# Patient Record
Sex: MALE | Race: WHITE | URBAN - METROPOLITAN AREA
[De-identification: names, ages, dates, MRNs, and addresses within clinical notes are randomized per-mention and may not be internally consistent; named-entity substitution may affect disease eponyms.]

---

## 2019-01-25 ENCOUNTER — APPOINTMENT (OUTPATIENT)
Age: 29
Setting detail: DERMATOLOGY
End: 2019-01-29

## 2019-01-25 DIAGNOSIS — L40.0 PSORIASIS VULGARIS: ICD-10-CM

## 2019-01-25 PROCEDURE — OTHER COUNSELING: OTHER

## 2019-01-25 PROCEDURE — OTHER TREATMENT REGIMEN: OTHER

## 2019-01-25 PROCEDURE — OTHER PRESCRIPTION: OTHER

## 2019-01-25 PROCEDURE — 99202 OFFICE O/P NEW SF 15 MIN: CPT

## 2019-01-25 PROCEDURE — OTHER MIPS QUALITY: OTHER

## 2019-01-25 RX ORDER — HALOBETASOL PROPIONATE 0.1 MG/G
LOTION TOPICAL
Qty: 1 | Refills: 3 | Status: ERX | COMMUNITY
Start: 2019-01-25

## 2019-01-25 RX ORDER — EMOLLIENT COMBINATION NO.32
EMULSION, EXTENDED RELEASE TOPICAL
Qty: 1 | Refills: 3 | Status: ERX | COMMUNITY
Start: 2019-01-25

## 2019-01-25 ASSESSMENT — LOCATION ZONE DERM: LOCATION ZONE: LEG

## 2019-01-25 ASSESSMENT — LOCATION DETAILED DESCRIPTION DERM: LOCATION DETAILED: RIGHT ANTERIOR PROXIMAL THIGH

## 2019-01-25 ASSESSMENT — LOCATION SIMPLE DESCRIPTION DERM: LOCATION SIMPLE: RIGHT THIGH

## 2019-01-25 NOTE — HPI: RASH
What Type Of Note Output Would You Prefer (Optional)?: Standard Output
How Severe Is Your Rash?: moderate
Is This A New Presentation, Or A Follow-Up?: Rash
Additional History: Patient went into urgent care and was prescribed Ketoconazole cream for a few weeks. Patient laid in bed and noticed the next day it flared up more; same spot, then went to PCP and was prescribed ultra lotrimen for tinea versicolor. Patient reports no other areas are affected and seems to be in one spot only; not spreading.

## 2019-01-25 NOTE — PROCEDURE: TREATMENT REGIMEN
Action 1: Continue
Detail Level: Zone
Discontinue Regimen: Ketoconazole cream \\nLotrimen Ultra
Samples Given: Bryhali lotion x 6\\nEpiCeram lotion x 2
Start Regimen: Bryhali 0.01 % lotion: Apply to affected areas QD for 2 weeks on leg *[facility]*\\nEpiCeram topical emulsion, extended release: Apply to affected areas QD/PRN *[facility]*

## 2019-03-08 ENCOUNTER — APPOINTMENT (OUTPATIENT)
Age: 29
Setting detail: DERMATOLOGY
End: 2019-03-09

## 2019-03-08 DIAGNOSIS — L40.0 PSORIASIS VULGARIS: ICD-10-CM

## 2019-03-08 PROCEDURE — 99213 OFFICE O/P EST LOW 20 MIN: CPT

## 2019-03-08 PROCEDURE — OTHER TREATMENT REGIMEN: OTHER

## 2019-03-08 PROCEDURE — OTHER COUNSELING: OTHER

## 2019-03-08 PROCEDURE — OTHER PRESCRIPTION: OTHER

## 2019-03-08 RX ORDER — CALCIPOTRIENE 50 UG/G
AEROSOL, FOAM TOPICAL
Qty: 1 | Refills: 2 | Status: ERX | COMMUNITY
Start: 2019-03-08

## 2019-03-08 ASSESSMENT — LOCATION ZONE DERM: LOCATION ZONE: LEG

## 2019-03-08 ASSESSMENT — LOCATION DETAILED DESCRIPTION DERM: LOCATION DETAILED: RIGHT ANTERIOR PROXIMAL THIGH

## 2019-03-08 ASSESSMENT — LOCATION SIMPLE DESCRIPTION DERM: LOCATION SIMPLE: RIGHT THIGH

## 2019-03-08 NOTE — PROCEDURE: TREATMENT REGIMEN
Initiate Regimen: Sorilux 0.005 % topical foam: Apply to affected areas on groin BID *sent to [facility]* Start Regimen: Sorilux 0.005 % topical foam: Apply to affected areas on groin BID *sent to [facility]*

## 2019-03-08 NOTE — PROCEDURE: TREATMENT REGIMEN
Continue Regimen: EpiCeram topical emulsion, extended release: Apply to affected areas QD/PRN *[facility]*

## 2020-08-14 ENCOUNTER — APPOINTMENT (OUTPATIENT)
Age: 30
Setting detail: DERMATOLOGY
End: 2020-08-16

## 2020-08-14 DIAGNOSIS — L40.0 PSORIASIS VULGARIS: ICD-10-CM

## 2020-08-14 DIAGNOSIS — B07.8 OTHER VIRAL WARTS: ICD-10-CM

## 2020-08-14 PROCEDURE — 99213 OFFICE O/P EST LOW 20 MIN: CPT

## 2020-08-14 PROCEDURE — OTHER TREATMENT REGIMEN: OTHER

## 2020-08-14 PROCEDURE — OTHER COUNSELING: OTHER

## 2020-08-14 PROCEDURE — OTHER MIPS QUALITY: OTHER

## 2020-08-14 ASSESSMENT — PGA PSORIASIS: PGA PSORIASIS 2020: MODERATE

## 2020-08-14 ASSESSMENT — LOCATION DETAILED DESCRIPTION DERM
LOCATION DETAILED: RIGHT ANTERIOR PROXIMAL THIGH
LOCATION DETAILED: LEFT ANTERIOR LATERAL PROXIMAL THIGH

## 2020-08-14 ASSESSMENT — TOTAL NUMBER OF VERRUCA VULGARIS: # OF LESIONS?: 1

## 2020-08-14 ASSESSMENT — LOCATION ZONE DERM: LOCATION ZONE: LEG

## 2020-08-14 ASSESSMENT — LOCATION SIMPLE DESCRIPTION DERM
LOCATION SIMPLE: RIGHT THIGH
LOCATION SIMPLE: LEFT THIGH

## 2020-08-14 NOTE — PROCEDURE: MIPS QUALITY
Quality 402: Tobacco Use And Help With Quitting Among Adolescents: Patient screened for tobacco and never smoked
Quality 226: Preventive Care And Screening: Tobacco Use: Screening And Cessation Intervention: Patient screened for tobacco use and is an ex/non-smoker
Quality 110: Preventive Care And Screening: Influenza Immunization: Influenza Immunization previously received during influenza season
Detail Level: Detailed

## 2020-08-14 NOTE — PROCEDURE: TREATMENT REGIMEN
Continue Regimen: EpiCeram topical emulsion, extended release: Apply to affected areas QD/PRN *[facility]*\\n\\nSorilux 0.005 % topical foam: Apply to affected areas on groin BID *sent to [facility]*
Detail Level: Zone
Action 4: Continue
Otc Regimen: over counter products. if not helping can seek attention here in our office with ln2
Detail Level: Simple